# Patient Record
Sex: FEMALE
[De-identification: names, ages, dates, MRNs, and addresses within clinical notes are randomized per-mention and may not be internally consistent; named-entity substitution may affect disease eponyms.]

---

## 2024-08-08 PROBLEM — Z00.00 ENCOUNTER FOR PREVENTIVE HEALTH EXAMINATION: Status: ACTIVE | Noted: 2024-08-08

## 2024-08-15 ENCOUNTER — APPOINTMENT (OUTPATIENT)
Dept: SURGICAL ONCOLOGY | Facility: CLINIC | Age: 44
End: 2024-08-15

## 2024-08-15 ENCOUNTER — APPOINTMENT (OUTPATIENT)
Dept: SURGICAL ONCOLOGY | Facility: CLINIC | Age: 44
End: 2024-08-15
Payer: MEDICAID

## 2024-08-15 VITALS
DIASTOLIC BLOOD PRESSURE: 90 MMHG | BODY MASS INDEX: 33.13 KG/M2 | OXYGEN SATURATION: 99 % | SYSTOLIC BLOOD PRESSURE: 140 MMHG | HEIGHT: 62 IN | WEIGHT: 180 LBS | HEART RATE: 76 BPM

## 2024-08-15 DIAGNOSIS — C18.9 MALIGNANT NEOPLASM OF COLON, UNSPECIFIED: ICD-10-CM

## 2024-08-15 DIAGNOSIS — C78.7 MALIGNANT NEOPLASM OF COLON, UNSPECIFIED: ICD-10-CM

## 2024-08-15 PROCEDURE — 99205 OFFICE O/P NEW HI 60 MIN: CPT

## 2024-08-19 NOTE — HISTORY OF PRESENT ILLNESS
[de-identified] : Dariela Coleman is a 43 y/o female with the diagnosis of left sided colon ca with biopsy proven synchronous bilobar liver mets, primary has been resected.   In January 2023 she wasn't feeling well, she felt week, she saw her PCP and was sent to the hospital ([facility]). In patient work up revealed colon cancer with liver mets.  2/1/2023 liver biopsy confirmed adenocarcinoma with extensive necrosis. HER 2 neg, CHIP She began FOLFOXIRI + Cetuximab in 2/2023 under the care of Dr. Gudino, now switched to Dr Martínez.  She was doing well on treatment and went on to have surgery on 5/6/23 she underwent a hemicolectomy with Dr. Yeo at [place]. Pathology showed 3/19 positive lymph nodes, [id number].   She underwent Y90 in 6/2023.  9/2023 KISIMA vaccine study while on immunotherapy d/c on 12/11/23 due to POD 1/4/24: Ongoing EGFRi. CEA going down drastically. Another round of Y90 on 2/8/24 5/2/24 post EGFRI with NTRK fusion starting larotrectinib. On 5/22/24 her CEA was 6.8 CA 19-9 was 46.  5/22/24 Acquired NTRK fusion post EGFR blockade. Got histotripsy along TRK fusion inhibitor. She was restarted on chemo.  6/14/24 ongoing therapy chemo+VEGFi. 6/14/24 CEA was 21.1 and CA 19-9 was 113.   *****liver biopsy performed on 7/8/24 showed adenocarcinoma with extensive necrosis. No evidence of mismatch repair deficiency  7/8/24 next cycle of FOLFOXIRI + DONYA, she hasnt been getting 5FU pump due to feeling poorly after. On 7/24/24 her CEA was 8.8 and CA 19-9 was 150.  8/7/24- last dose of chemo irinotecan/oxaliplatin+ DONYA. She receives treatment every 2 weeks.   CtDNA on 8/1/24 was positive 208.41 MRI 7/23/24 Showed redemonstration of multiple hepatic metastases, with index lesions. Several of the lesions are new or slightly increased in size since CT 5/16/24. Evolving post treatment changes of the medial segment 3 lesion, with multiple peripheral enlarging nodules, and persistently enhancing component.    She presents today with family, to discuss a possible MILAGROS pump treatment. She reports some peripheral neuropathy. Denies abdominal pain, some diarrhea, denies weightless. She has gained weight from her treatment as it has effected her thyroid.   PMH: No PMH PSH: colon surgery, 2 cesareans meds: none social: she has 11 children, youngest is 2.5. never smoker/ETOH

## 2024-08-19 NOTE — HISTORY OF PRESENT ILLNESS
[de-identified] : Dariela Coleman is a 41 y/o female with the diagnosis of left sided colon ca with biopsy proven synchronous bilobar liver mets, primary has been resected.   In January 2023 she wasn't feeling well, she felt week, she saw her PCP and was sent to the hospital ([facility]). In patient work up revealed colon cancer with liver mets.  2/1/2023 liver biopsy confirmed adenocarcinoma with extensive necrosis. HER 2 neg, CHIP She began FOLFOXIRI + Cetuximab in 2/2023 under the care of Dr. Gudino, now switched to Dr Martínez.  She was doing well on treatment and went on to have surgery on 5/6/23 she underwent a hemicolectomy with Dr. Yeo at [place]. Pathology showed 3/19 positive lymph nodes, [id number].   She underwent Y90 in 6/2023.  9/2023 KISIMA vaccine study while on immunotherapy d/c on 12/11/23 due to POD 1/4/24: Ongoing EGFRi. CEA going down drastically. Another round of Y90 on 2/8/24 5/2/24 post EGFRI with NTRK fusion starting larotrectinib. On 5/22/24 her CEA was 6.8 CA 19-9 was 46.  5/22/24 Acquired NTRK fusion post EGFR blockade. Got histotripsy along TRK fusion inhibitor. She was restarted on chemo.  6/14/24 ongoing therapy chemo+VEGFi. 6/14/24 CEA was 21.1 and CA 19-9 was 113.   *****liver biopsy performed on 7/8/24 showed adenocarcinoma with extensive necrosis. No evidence of mismatch repair deficiency  7/8/24 next cycle of FOLFOXIRI + DONYA, she hasnt been getting 5FU pump due to feeling poorly after. On 7/24/24 her CEA was 8.8 and CA 19-9 was 150.  8/7/24- last dose of chemo irinotecan/oxaliplatin+ DONYA. She receives treatment every 2 weeks.   CtDNA on 8/1/24 was positive 208.41 MRI 7/23/24 Showed redemonstration of multiple hepatic metastases, with index lesions. Several of the lesions are new or slightly increased in size since CT 5/16/24. Evolving post treatment changes of the medial segment 3 lesion, with multiple peripheral enlarging nodules, and persistently enhancing component.    She presents today with family, to discuss a possible MILAGROS pump treatment. She reports some peripheral neuropathy. Denies abdominal pain, some diarrhea, denies weightless. She has gained weight from her treatment as it has effected her thyroid.   PMH: No PMH PSH: colon surgery, 2 cesareans meds: none social: she has 11 children, youngest is 2.5. never smoker/ETOH

## 2024-08-19 NOTE — ASSESSMENT
[FreeTextEntry1] : Dariela Coleman is a 45 y/o female with colon cancer with liver mets.   She presents today to discuss her MILAGROS pump candidacy. Unfortunately, due to her history extensive history of sytemic therapy, histotripsy and Y90 she is no longer a MILAGROS pump candidate. I told her that if she is a candidate for any upcoming trials that I will let her know. For now, I would recommend continuation of her systemic therapy.   Patient and family asked many great questions which I answered to the best of my ability.  Thank you again for this kind referral. Please don't hesitate to contact me with any questions.  Sincerely,    Pat Patrick MD Division of Surgical Oncology Director, [facility] Hep[facility] Ar[facility] Infusion [facility] , [employer] [facility]

## 2024-08-19 NOTE — RESULTS/DATA
[FreeTextEntry1] :  Images: CT chest 4/1/24  1. No definite evidence of metastatic disease in the chest. 2. Stable 2-3 mm bilateral perifissural nodules, which are not significantly changed from 02/24/2023, likely intrapulmonary lymph nodes. Continued CT surveillance per oncologic protocol. 3. Stable subcentimeter short axis diaphragmatic and cardiophrenic lymph nodes, which are not significantly changed in size from 02/2023. No new or enlarging adenopathy. 4. Re-identified hepatic metastases.  CT liver advanced 5/16/24 1. No evidence of gastric perforation. 2. Status post treatment of medial segment 3 lesion with treatment cavity not contacting the stomach. The enhancing portion of the lesion outside of the treatment zone at least abuts the stomach, tumor involvement is not excluded. 3. The other hepatic metastases are unchanged increased, as above.  CT CAP 7/6/23 resolution of an internal mammary lymph node since 4/19/23. unchanged small pericardial lymph nodes are indeterminate. No definitive evidence of metastatic disease in the chest. Interval radioembolization of segment 4 and 5 tumor, lesions have decreased in size. Two additional lesions in the left lateral segment are also decreased in size. Stable focal calcification lesion in the tip of the left lateral segment.  MRI 1/26/23 8 cm transverse colon neoplasm with pericolo adenopathy and bilobar hepatic metastases.   MRI EOVIST 7/23/24  Redemonstration of multiple hepatic metastases, with index lesions listed above. Several of the lesions are new or slightly increased in size since CT from 05/16/2024. 2. Evolving post treatment changes of the medial segment 3 lesion, with multipleperipheral enlarging nodules, and persistently enhancing component.   Procedures & Pathology: hemicolectomy 5/16/23: Invasive adenocarcinoma of the transverse colon, high-grade I. HISTOLOGIC TYPE: Adenocarcinoma, intestinal type II. PATHOLOGIC STAGE CLASSIFICATION (pTNM, AJCC 8th) T4a: Tumor penetrates serosa (including communication with peritoneal surface through areas of inflammation) N1b: Metastases present in 2-3 lymph nodes Lymph nodes number positive: 3 Total lymph nodes harvested: 19 M1a: Metastases confined to one organ, excluding peritoneum (liver, by clinical history) III. HISTOLOGIC GRADE (G): Poorly diff erentiated (G3) IV. ANATOMIC SITE: Transverse V. MARGIN STATUS (R): R0: Complete tumor resection with negative margins liver biopsy performed on 7/8/24 showed adenocarcinoma with extensive necrosis. No evidence of mismatch repair deficiency DIAGNOSIS: A. Liver cores x 6, biopsy: Metastatic colorectal adenocarcinoma.   Labs: 5/22/24 CA 19-9 46 CEA 6.8 6/5/24 CA 19-9 84 CEA 11.7 7/8/24 CA 19-9 125 CEA  7.2 7/24/24 CA 19-9 150 CEA 8.8   8/7/24  AST 51 ALT 46 tbili 0.5

## 2024-08-19 NOTE — HISTORY OF PRESENT ILLNESS
[de-identified] : Dariela Coleman is a 43 y/o female with the diagnosis of left sided colon ca with biopsy proven synchronous bilobar liver mets, primary has been resected.   In January 2023 she wasn't feeling well, she felt week, she saw her PCP and was sent to the hospital ([facility]). In patient work up revealed colon cancer with liver mets.  2/1/2023 liver biopsy confirmed adenocarcinoma with extensive necrosis. HER 2 neg, CHIP She began FOLFOXIRI + Cetuximab in 2/2023 under the care of Dr. Gudino, now switched to Dr Martínez.  She was doing well on treatment and went on to have surgery on 5/6/23 she underwent a hemicolectomy with Dr. Yeo at [place]. Pathology showed 3/19 positive lymph nodes, [id number].   She underwent Y90 in 6/2023.  9/2023 KISIMA vaccine study while on immunotherapy d/c on 12/11/23 due to POD 1/4/24: Ongoing EGFRi. CEA going down drastically. Another round of Y90 on 2/8/24 5/2/24 post EGFRI with NTRK fusion starting larotrectinib. On 5/22/24 her CEA was 6.8 CA 19-9 was 46.  5/22/24 Acquired NTRK fusion post EGFR blockade. Got histotripsy along TRK fusion inhibitor. She was restarted on chemo.  6/14/24 ongoing therapy chemo+VEGFi. 6/14/24 CEA was 21.1 and CA 19-9 was 113.   *****liver biopsy performed on 7/8/24 showed adenocarcinoma with extensive necrosis. No evidence of mismatch repair deficiency  7/8/24 next cycle of FOLFOXIRI + DONYA, she hasnt been getting 5FU pump due to feeling poorly after. On 7/24/24 her CEA was 8.8 and CA 19-9 was 150.  8/7/24- last dose of chemo irinotecan/oxaliplatin+ DONYA. She receives treatment every 2 weeks.   CtDNA on 8/1/24 was positive 208.41 MRI 7/23/24 Showed redemonstration of multiple hepatic metastases, with index lesions. Several of the lesions are new or slightly increased in size since CT 5/16/24. Evolving post treatment changes of the medial segment 3 lesion, with multiple peripheral enlarging nodules, and persistently enhancing component.    She presents today with family, to discuss a possible MILAGROS pump treatment. She reports some peripheral neuropathy. Denies abdominal pain, some diarrhea, denies weightless. She has gained weight from her treatment as it has effected her thyroid.   PMH: No PMH PSH: colon surgery, 2 cesareans meds: none social: she has 11 children, youngest is 2.5. never smoker/ETOH